# Patient Record
Sex: MALE | Race: WHITE | NOT HISPANIC OR LATINO | Employment: OTHER | URBAN - NONMETROPOLITAN AREA
[De-identification: names, ages, dates, MRNs, and addresses within clinical notes are randomized per-mention and may not be internally consistent; named-entity substitution may affect disease eponyms.]

---

## 2017-07-08 ENCOUNTER — HOSPITAL ENCOUNTER (EMERGENCY)
Facility: HOSPITAL | Age: 62
Discharge: PSYCHIATRIC OR SUBSTANCE USE FACILITY | End: 2017-07-08
Attending: EMERGENCY MEDICINE | Admitting: EMERGENCY MEDICINE

## 2017-07-08 ENCOUNTER — HOSPITAL ENCOUNTER (INPATIENT)
Facility: HOSPITAL | Age: 62
LOS: 4 days | Discharge: HOME | End: 2017-07-12
Attending: PSYCHIATRY & NEUROLOGY | Admitting: PSYCHIATRY & NEUROLOGY

## 2017-07-08 VITALS
DIASTOLIC BLOOD PRESSURE: 112 MMHG | OXYGEN SATURATION: 97 % | HEIGHT: 70 IN | WEIGHT: 190 LBS | TEMPERATURE: 98 F | BODY MASS INDEX: 27.2 KG/M2 | RESPIRATION RATE: 18 BRPM | HEART RATE: 65 BPM | SYSTOLIC BLOOD PRESSURE: 153 MMHG

## 2017-07-08 DIAGNOSIS — F32.89 OTHER DEPRESSION: Primary | ICD-10-CM

## 2017-07-08 DIAGNOSIS — R45.851 SUICIDAL IDEATION: ICD-10-CM

## 2017-07-08 PROBLEM — F32.9 MDD (MAJOR DEPRESSIVE DISORDER): Status: ACTIVE | Noted: 2017-07-08

## 2017-07-08 LAB
6-ACETYL MORPHINE: NEGATIVE
ALBUMIN SERPL-MCNC: 4.7 G/DL (ref 3.4–4.8)
ALBUMIN/GLOB SERPL: 1.3 G/DL (ref 1.5–2.5)
ALP SERPL-CCNC: 109 U/L (ref 40–129)
ALT SERPL W P-5'-P-CCNC: 30 U/L (ref 10–44)
AMPHET+METHAMPHET UR QL: NEGATIVE
ANION GAP SERPL CALCULATED.3IONS-SCNC: 5.9 MMOL/L (ref 3.6–11.2)
AST SERPL-CCNC: 26 U/L (ref 10–34)
BARBITURATES UR QL SCN: NEGATIVE
BASOPHILS # BLD AUTO: 0.1 10*3/MM3 (ref 0–0.3)
BASOPHILS NFR BLD AUTO: 1 % (ref 0–2)
BENZODIAZ UR QL SCN: NEGATIVE
BILIRUB SERPL-MCNC: 0.7 MG/DL (ref 0.2–1.8)
BILIRUB UR QL STRIP: NEGATIVE
BUN BLD-MCNC: 13 MG/DL (ref 7–21)
BUN/CREAT SERPL: 13.3 (ref 7–25)
BUPRENORPHINE SERPL-MCNC: NEGATIVE NG/ML
CALCIUM SPEC-SCNC: 10 MG/DL (ref 7.7–10)
CANNABINOIDS SERPL QL: NEGATIVE
CHLORIDE SERPL-SCNC: 102 MMOL/L (ref 99–112)
CLARITY UR: CLEAR
CO2 SERPL-SCNC: 33.1 MMOL/L (ref 24.3–31.9)
COCAINE UR QL: NEGATIVE
COLOR UR: YELLOW
CREAT BLD-MCNC: 0.98 MG/DL (ref 0.43–1.29)
DEPRECATED RDW RBC AUTO: 44.3 FL (ref 37–54)
EOSINOPHIL # BLD AUTO: 0.18 10*3/MM3 (ref 0–0.7)
EOSINOPHIL NFR BLD AUTO: 1.8 % (ref 0–5)
ERYTHROCYTE [DISTWIDTH] IN BLOOD BY AUTOMATED COUNT: 13.9 % (ref 11.5–14.5)
ETHANOL BLD-MCNC: <10 MG/DL
ETHANOL UR QL: <0.01 %
GFR SERPL CREATININE-BSD FRML MDRD: 78 ML/MIN/1.73
GFR SERPL CREATININE-BSD FRML MDRD: 94 ML/MIN/1.73
GLOBULIN UR ELPH-MCNC: 3.7 GM/DL
GLUCOSE BLD-MCNC: 100 MG/DL (ref 70–110)
GLUCOSE UR STRIP-MCNC: NEGATIVE MG/DL
HCT VFR BLD AUTO: 50.8 % (ref 42–52)
HGB BLD-MCNC: 17.5 G/DL (ref 14–18)
HGB UR QL STRIP.AUTO: NEGATIVE
IMM GRANULOCYTES # BLD: 0.02 10*3/MM3 (ref 0–0.03)
IMM GRANULOCYTES NFR BLD: 0.2 % (ref 0–0.5)
KETONES UR QL STRIP: NEGATIVE
LEUKOCYTE ESTERASE UR QL STRIP.AUTO: NEGATIVE
LYMPHOCYTES # BLD AUTO: 2.52 10*3/MM3 (ref 1–3)
LYMPHOCYTES NFR BLD AUTO: 25.6 % (ref 21–51)
MCH RBC QN AUTO: 30.6 PG (ref 27–33)
MCHC RBC AUTO-ENTMCNC: 34.4 G/DL (ref 33–37)
MCV RBC AUTO: 89 FL (ref 80–94)
METHADONE UR QL SCN: NEGATIVE
MONOCYTES # BLD AUTO: 0.79 10*3/MM3 (ref 0.1–0.9)
MONOCYTES NFR BLD AUTO: 8 % (ref 0–10)
NEUTROPHILS # BLD AUTO: 6.24 10*3/MM3 (ref 1.4–6.5)
NEUTROPHILS NFR BLD AUTO: 63.4 % (ref 30–70)
NITRITE UR QL STRIP: NEGATIVE
OPIATES UR QL: NEGATIVE
OSMOLALITY SERPL CALC.SUM OF ELEC: 281.5 MOSM/KG (ref 273–305)
OXYCODONE UR QL SCN: NEGATIVE
PCP UR QL SCN: NEGATIVE
PH UR STRIP.AUTO: 6.5 [PH] (ref 5–8)
PLATELET # BLD AUTO: 321 10*3/MM3 (ref 130–400)
PMV BLD AUTO: 9.3 FL (ref 6–10)
POTASSIUM BLD-SCNC: 3.6 MMOL/L (ref 3.5–5.3)
PROT SERPL-MCNC: 8.4 G/DL (ref 6–8)
PROT UR QL STRIP: NEGATIVE
RBC # BLD AUTO: 5.71 10*6/MM3 (ref 4.7–6.1)
SODIUM BLD-SCNC: 141 MMOL/L (ref 135–153)
SP GR UR STRIP: 1.02 (ref 1–1.03)
UROBILINOGEN UR QL STRIP: NORMAL
WBC NRBC COR # BLD: 9.85 10*3/MM3 (ref 4.5–12.5)

## 2017-07-08 RX ORDER — ECHINACEA PURPUREA EXTRACT 125 MG
2 TABLET ORAL AS NEEDED
Status: DISCONTINUED | OUTPATIENT
Start: 2017-07-08 | End: 2017-07-12 | Stop reason: HOSPADM

## 2017-07-08 RX ORDER — LOPERAMIDE HYDROCHLORIDE 2 MG/1
2 CAPSULE ORAL 4 TIMES DAILY PRN
Status: DISCONTINUED | OUTPATIENT
Start: 2017-07-08 | End: 2017-07-12 | Stop reason: HOSPADM

## 2017-07-08 RX ORDER — ASPIRIN 81 MG/1
81 TABLET ORAL DAILY
COMMUNITY

## 2017-07-08 RX ORDER — BENZTROPINE MESYLATE 1 MG/1
1 TABLET ORAL DAILY PRN
Status: DISCONTINUED | OUTPATIENT
Start: 2017-07-08 | End: 2017-07-12 | Stop reason: HOSPADM

## 2017-07-08 RX ORDER — ACETAMINOPHEN 325 MG/1
650 TABLET ORAL EVERY 4 HOURS PRN
Status: DISCONTINUED | OUTPATIENT
Start: 2017-07-08 | End: 2017-07-12 | Stop reason: HOSPADM

## 2017-07-08 RX ORDER — TRAZODONE HYDROCHLORIDE 50 MG/1
50 TABLET ORAL NIGHTLY PRN
Status: DISCONTINUED | OUTPATIENT
Start: 2017-07-08 | End: 2017-07-12 | Stop reason: HOSPADM

## 2017-07-08 RX ORDER — BENZONATATE 100 MG/1
100 CAPSULE ORAL 3 TIMES DAILY PRN
Status: DISCONTINUED | OUTPATIENT
Start: 2017-07-08 | End: 2017-07-12 | Stop reason: HOSPADM

## 2017-07-08 RX ORDER — IBUPROFEN 400 MG/1
400 TABLET ORAL ONCE
Status: COMPLETED | OUTPATIENT
Start: 2017-07-08 | End: 2017-07-08

## 2017-07-08 RX ORDER — BENZTROPINE MESYLATE 1 MG/ML
0.5 INJECTION INTRAMUSCULAR; INTRAVENOUS DAILY PRN
Status: DISCONTINUED | OUTPATIENT
Start: 2017-07-08 | End: 2017-07-12 | Stop reason: HOSPADM

## 2017-07-08 RX ORDER — HYDROXYZINE 50 MG/1
50 TABLET, FILM COATED ORAL EVERY 6 HOURS PRN
Status: DISCONTINUED | OUTPATIENT
Start: 2017-07-08 | End: 2017-07-12 | Stop reason: HOSPADM

## 2017-07-08 RX ORDER — ASPIRIN 81 MG/1
81 TABLET ORAL DAILY
Status: DISCONTINUED | OUTPATIENT
Start: 2017-07-09 | End: 2017-07-12 | Stop reason: HOSPADM

## 2017-07-08 RX ORDER — ONDANSETRON 4 MG/1
4 TABLET, FILM COATED ORAL EVERY 6 HOURS PRN
Status: DISCONTINUED | OUTPATIENT
Start: 2017-07-08 | End: 2017-07-12 | Stop reason: HOSPADM

## 2017-07-08 RX ORDER — FAMOTIDINE 20 MG/1
20 TABLET, FILM COATED ORAL 2 TIMES DAILY PRN
Status: DISCONTINUED | OUTPATIENT
Start: 2017-07-08 | End: 2017-07-12 | Stop reason: HOSPADM

## 2017-07-08 RX ORDER — ALUMINA, MAGNESIA, AND SIMETHICONE 2400; 2400; 240 MG/30ML; MG/30ML; MG/30ML
15 SUSPENSION ORAL EVERY 6 HOURS PRN
Status: DISCONTINUED | OUTPATIENT
Start: 2017-07-08 | End: 2017-07-12 | Stop reason: HOSPADM

## 2017-07-08 RX ORDER — LOSARTAN POTASSIUM 25 MG/1
25 TABLET ORAL EVERY 12 HOURS SCHEDULED
Status: DISCONTINUED | OUTPATIENT
Start: 2017-07-08 | End: 2017-07-12 | Stop reason: HOSPADM

## 2017-07-08 RX ORDER — LOSARTAN POTASSIUM 25 MG/1
25 TABLET ORAL 2 TIMES DAILY
COMMUNITY

## 2017-07-08 RX ADMIN — IBUPROFEN 400 MG: 400 TABLET ORAL at 19:24

## 2017-07-08 RX ADMIN — LOSARTAN POTASSIUM 25 MG: 25 TABLET, FILM COATED ORAL at 21:19

## 2017-07-08 RX ADMIN — HYDROXYZINE HYDROCHLORIDE 50 MG: 50 TABLET ORAL at 21:19

## 2017-07-08 RX ADMIN — TRAZODONE HYDROCHLORIDE 50 MG: 50 TABLET ORAL at 21:19

## 2017-07-08 NOTE — ED PROVIDER NOTES
Subjective   Patient is a 62 y.o. male presenting with mental health disorder.   History provided by:  Patient   used: No    Mental Health Problem   Presenting symptoms: aggressive behavior, agitation and suicidal threats    Patient accompanied by:  Caregiver  Degree of incapacity (severity):  Moderate  Onset quality:  Sudden  Duration:  1 day  Timing:  Constant  Progression:  Worsening  Chronicity:  New  Context: not alcohol use, not drug abuse, not noncompliant and not recent medication change    Time since last psychoactive medication taken:  1 day  Relieved by:  Nothing  Worsened by:  Nothing  Associated symptoms: feelings of worthlessness and insomnia    Associated symptoms: no abdominal pain, no anhedonia, no chest pain, no fatigue and no headaches        Review of Systems   Constitutional: Negative for fatigue.   Cardiovascular: Negative for chest pain.   Gastrointestinal: Negative for abdominal pain.   Neurological: Negative for headaches.   Psychiatric/Behavioral: Positive for agitation. The patient has insomnia.    All other systems reviewed and are negative.      Past Medical History:   Diagnosis Date   • Depression    • Hypertension        Allergies   Allergen Reactions   • Lisinopril        Past Surgical History:   Procedure Laterality Date   • SHOULDER SURGERY         Family History   Problem Relation Age of Onset   • No Known Problems Mother    • No Known Problems Father    • No Known Problems Sister    • No Known Problems Brother    • No Known Problems Maternal Aunt    • No Known Problems Paternal Aunt    • No Known Problems Maternal Uncle    • No Known Problems Paternal Uncle    • No Known Problems Maternal Grandfather    • No Known Problems Maternal Grandmother    • No Known Problems Paternal Grandfather    • No Known Problems Paternal Grandmother    • No Known Problems Cousin        Social History     Social History   • Marital status:      Spouse name: N/A   • Number  "of children: N/A   • Years of education: N/A     Social History Main Topics   • Smoking status: Never Smoker   • Smokeless tobacco: None   • Alcohol use No   • Drug use: No   • Sexual activity: Not Asked     Other Topics Concern   • None     Social History Narrative   • None           Objective   Physical Exam   Constitutional: He is oriented to person, place, and time. He appears well-developed and well-nourished. No distress.   HENT:   Head: Normocephalic.   Right Ear: External ear normal.   Left Ear: External ear normal.   Nose: Nose normal.   Mouth/Throat: Oropharynx is clear and moist.   Eyes: Conjunctivae and EOM are normal. Pupils are equal, round, and reactive to light. Right eye exhibits no discharge.   Neck: Normal range of motion. Neck supple. No thyromegaly present.   Cardiovascular: Normal rate, regular rhythm, normal heart sounds and intact distal pulses.    No murmur heard.  Pulmonary/Chest: Effort normal and breath sounds normal.   Abdominal: Soft. Bowel sounds are normal. He exhibits no mass. There is no tenderness. There is no guarding.   Musculoskeletal: Normal range of motion.   Neurological: He is alert and oriented to person, place, and time. No cranial nerve deficit. Coordination normal.   Skin: He is not diaphoretic.   Psychiatric: His mood appears anxious. He is withdrawn. He is not actively hallucinating. Thought content is not paranoid and not delusional. Cognition and memory are not impaired. He exhibits a depressed mood. He expresses suicidal ideation. He expresses no homicidal ideation. He expresses no homicidal plans.   Nursing note and vitals reviewed.      Procedures         ED Course  ED Course   Comment By Time   62-year-old male comes in complaining of \"depression and suicidal ideation\" for the last several days.  Patient denies any alcohol or drug abuse. Patient also complains of chronic diffuse pain. Patient states that he hasn't been taking his paxil or other meds. Not been " able to get his meds. Zeb Elizabeth PA-C 07/08 1837                  [facility]    Final diagnoses:   Other depression   Suicidal ideation            Zeb Elizabeth PA-C  07/08/17 1948

## 2017-07-09 PROCEDURE — 99221 1ST HOSP IP/OBS SF/LOW 40: CPT | Performed by: PSYCHIATRY & NEUROLOGY

## 2017-07-09 RX ORDER — DULOXETIN HYDROCHLORIDE 30 MG/1
30 CAPSULE, DELAYED RELEASE ORAL DAILY
Status: DISCONTINUED | OUTPATIENT
Start: 2017-07-09 | End: 2017-07-09

## 2017-07-09 RX ORDER — DULOXETIN HYDROCHLORIDE 30 MG/1
30 CAPSULE, DELAYED RELEASE ORAL DAILY
Status: DISCONTINUED | OUTPATIENT
Start: 2017-07-10 | End: 2017-07-10

## 2017-07-09 RX ADMIN — LOSARTAN POTASSIUM 25 MG: 25 TABLET, FILM COATED ORAL at 21:30

## 2017-07-09 RX ADMIN — TRAZODONE HYDROCHLORIDE 50 MG: 50 TABLET ORAL at 21:32

## 2017-07-09 RX ADMIN — ASPIRIN 81 MG: 81 TABLET ORAL at 08:32

## 2017-07-09 RX ADMIN — MAGNESIUM HYDROXIDE 10 ML: 2400 SUSPENSION ORAL at 12:54

## 2017-07-09 RX ADMIN — LOSARTAN POTASSIUM 25 MG: 25 TABLET, FILM COATED ORAL at 08:32

## 2017-07-09 NOTE — PLAN OF CARE
Problem: BH Patient Care Overview (Adult)  Goal: Plan of Care Review  Outcome: Ongoing (interventions implemented as appropriate)    07/09/17 0210   Outcome Evaluation   Outcome Summary/Follow up Plan new patient

## 2017-07-09 NOTE — PLAN OF CARE
Problem: BH Patient Care Overview (Adult)  Goal: Plan of Care Review  Outcome: Ongoing (interventions implemented as appropriate)    07/09/17 8711   Outcome Evaluation   Outcome Summary/Follow up Plan Pt cooperative with staff. Pt reports anxietyand depression 8/10. Pt denies SI/HI/JANELL. Pt reports feeling HHW. Continue to monitor.   Coping/Psychosocial Response Interventions   Plan Of Care Reviewed With patient   Coping/Psychosocial   Patient Agreement with Plan of Care agrees   Patient Care Overview   Progress no change

## 2017-07-09 NOTE — H&P
"Winnie Welch RN  Admission Date: 7/8/2017  7:11 AM 07/09/17    Phill Nichols, 62 y.o. Male  Subjective   \"I have a poor attitude\"    Chief Complaint:  Increased Depression, Suicidal Ideation    HPI:  Phill Nichols is a 62 y.o. male who was admitted for complaints of Increased Depression and Suicidal Ideation.  He presented to [facility] ED stating he has felt more depressed and having intrusive suicide thoughts over the past several days.  He was admitted for 18 days at [facility] in [place], [place] approximately 8 to 10 years ago for having a suicide plan and having a loaded rifle cocked and in hands.  He denies a specific plan but admits to thinking shooting himself, cutting his wrists, or driving his vehicle into an oncoming tractor tralor.  Patient's mother passed away in February and her birthday was reported to be in May.  He reports being his mother's primary care giver. He denies any thoughts of homicide, delusions, or paranoia. The patient ideatifes himself as \"having a poor attitude\".  He reports auditory hallucinations describing people hollering and laughing at him.  He is tearful at times and his affect is flat.  He endorses feeling hopeless, helpless, and worthless.  He reports chronic pain related to serving 7 years in the [employer] and states he stresses over financing from his chronic health problems.  The patient reports poor sleep and states his appetite varies.  He denies any history of substance abuse.  The patient has been admitted to the Adult Psychiatric Unit for crisis intervention, safety, and stabilization of symptoms.    On evaluation, he stated his depression has been present for a long time but the past 3 months his depression has escalated. He was supposed to switch from Paxil to Cymbalta. He stated Cymbalta was expensive at [facility] and insurance was not covering it. He is interested in trying Cymbalta if it is affordable. He was given goodRTalentology card and notified to go to [facility] as it is cheaper. He " "reports his sleep is poor, takes 2-3 hours to initiate, wakes up frequently through the night, reports pain wakes him up, reports nightmares frequently, denied nightly. Reports low energy and being \"groggy\" all day. He feels his depression has worsening over the pat 3 months because of the weather and because \"I just get so depressed and get tired of taking my medication\". Stated he is completely out of Paxil, last took it 1 month ago.     Past Psych History:   He has had 1 previous psychiatric hospitalization and denies any current outpatient treatment.  The patient was reportedly admitted to [facility] in [place], [place] approximately 8 to 10 years ago for having a suicidal plan with a loaded rifle cocked and in hands.  He was kept for a total of 18 days.  He hamzah any other suicide attempts. No current medications. He stated he has been on Paxil, Zoloft and was uncertain of others. He stated his PCP had him on Paxil but he ran out and he was going to be on Cymbalta but he was unable to afford it.     Substance Abuse: His UDS is negative, he denies any illicit drug, alcohol, or tobacco use.    Family History:  family history includes No Known Problems in his brother, cousin, father, maternal aunt, maternal grandfather, maternal grandmother, maternal uncle, mother, paternal aunt, paternal grandfather, paternal grandmother, paternal uncle, and sister.    Personal and social history:  Patient was born in [place], [place] and raised with his 9 siblings in [place], [place].  Both of his parents are  having recently lost his mother in February.  The patient is  and reports his children as grown.  He has a high school education and served 7 years in the [employer] and was discharged out honorably.  Patient reports he receives social security and disability.  There are no reports of abuse or arrests and he denies any current legal issues.    Medical/Surgical History:  Negative history for Seizures.    Past Medical History: "   Diagnosis Date   • Depression    • Hypertension      Past Surgical History:   Procedure Laterality Date   • SHOULDER SURGERY         Allergies   Allergen Reactions   • Lisinopril      Social History   Substance Use Topics   • Smoking status: Never Smoker   • Smokeless tobacco: Not on file   • Alcohol use No     Current Medications:   Current Facility-Administered Medications   Medication Dose Route Frequency Provider Last Rate Last Dose   • acetaminophen (TYLENOL) tablet 650 mg  650 mg Oral Q4H PRN Mamta Currie MD       • aluminum-magnesium hydroxide-simethicone (MAALOX MAX) 400-400-40 MG/5ML suspension 15 mL  15 mL Oral Q6H PRN Mamta Currie MD       • aspirin EC tablet 81 mg  81 mg Oral Daily Mamta Currie MD       • benzonatate (TESSALON) capsule 100 mg  100 mg Oral TID PRN Mamta Currie MD       • benztropine (COGENTIN) tablet 1 mg  1 mg Oral Daily PRN Mamta Currie MD        Or   • benztropine (COGENTIN) injection 0.5 mg  0.5 mg Intramuscular Daily PRN Mamta Currie MD       • famotidine (PEPCID) tablet 20 mg  20 mg Oral BID PRN Mamta Currie MD       • hydrOXYzine (ATARAX) tablet 50 mg  50 mg Oral Q6H PRN Mamta Currie MD   50 mg at 07/08/17 2119   • loperamide (IMODIUM) capsule 2 mg  2 mg Oral 4x Daily PRN Mamta Currie MD       • losartan (COZAAR) tablet 25 mg  25 mg Oral Q12H Mamta Currie MD   25 mg at 07/08/17 2119   • magnesium hydroxide (MILK OF MAGNESIA) suspension 2400 mg/10mL 10 mL  10 mL Oral Daily PRN Mamta Currie MD       • ondansetron (ZOFRAN) tablet 4 mg  4 mg Oral Q6H PRN Mamta Currie MD       • sodium chloride (OCEAN) nasal spray 2 spray  2 spray Each Nare PRN Mamta Currie MD       • traZODone (DESYREL) tablet 50 mg  50 mg Oral Nightly PRN Mamta Currie MD   50 mg at 07/08/17 2119       Review of Systems    Review of Systems - General ROS: negative for - chills, fever or malaise  Ophthalmic ROS: negative for - loss of vision  ENT ROS: negative for - hearing change  Allergy and Immunology ROS: negative  for - hives  Hematological and Lymphatic ROS: negative for - bleeding problems  Endocrine ROS: negative for - skin changes  Respiratory ROS: no cough, shortness of breath, or wheezing  Cardiovascular ROS: no chest pain or dyspnea on exertion  Gastrointestinal ROS: no abdominal pain, no black or bloody stools, +constipation  Genito-Urinary ROS: no dysuria, trouble voiding, or hematuria  Musculoskeletal ROS: negative for - gait disturbance +shoulder and back pain   Neurological ROS: no TIA or stroke symptoms +headaches  Dermatological ROS: negative for rash    Objective       General Appearance:    Alert, cooperative,    Head:    Normocephalic, without obvious abnormality, atraumatic   Eyes:            Lids and lashes normal, conjunctivae and sclerae normal, no   icterus, no pallor, corneas clear   Ears:    Ears appear intact with no abnormalities noted   Throat:   No oral lesions, no thrush, oral mucosa moist   Neck:   No adenopathy, supple, trachea midline, no thyromegaly, no   carotid bruit, no JVD   Back:     No kyphosis present, no scoliosis present, no skin lesions,      erythema or scars, no tenderness to percussion or                   palpation,   range of motion normal   Lungs:     Clear to auscultation,respirations regular, even and                  unlabored    Heart:    Regular rhythm and normal rate, normal S1 and S2, no            murmur, no gallop, no rub, no click   Chest Wall:    No abnormalities observed   Abdomen:     Normal bowel sounds, no masses, no organomegaly, soft        non-tender, non-distended, no guarding, no rebound                tenderness   Rectal:     Deferred   Extremities:   Moves all extremities well, no edema, no cyanosis, no             redness   Pulses:   Pulses palpable and equal bilaterally   Skin:   No bleeding, bruising or rash   Lymph nodes:   No palpable adenopathy   Neurologic:   Cranial nerves 2 - 12 grossly intact       Blood pressure 110/67, pulse 51, temperature 97  "°F (36.1 °C), temperature source Tympanic, resp. rate 16, height 70\" (177.8 cm), weight 181 lb 9.6 oz (82.4 kg), SpO2 98 %.    Mental Status Exam:   Hygiene:   good  Cooperation:  Cooperative  Eye Contact:  Fair  Psychomotor Behavior:  Slow  Affect:  Flat, Sad, and Tearful  Hopelessness: 10  Speech:  Slow Pattern  Thought Process:  Linear  Thought Content:  Mood congurent  Suicidal:  Suicidal Ideation  Homicidal:  None  Hallucinations:  Auditory  Delusion:  None  Memory:  Intact  Orientation:  Person, Place and Situation  Reliability:  fair  Insight:  Fair  Judgement:  Fair  Impulse Control:  Poor  Physical/Medical Issues:  Yes HTN    Medical Decision Making:              Labs:         Results for OSMAN ENRIQUEZ (MRN [id number]) as of 7/9/2017 07:12   Ref. Range 7/8/2017 18:27 7/8/2017 18:29   Glucose Latest Ref Range: 70 - 110 mg/dL 100    Sodium Latest Ref Range: 135 - 153 mmol/L 141    Potassium Latest Ref Range: 3.5 - 5.3 mmol/L 3.6    CO2 Latest Ref Range: 24.3 - 31.9 mmol/L 33.1 (H)    Chloride Latest Ref Range: 99 - 112 mmol/L 102    Anion Gap Latest Ref Range: 3.6 - 11.2 mmol/L 5.9    Creatinine Latest Ref Range: 0.43 - 1.29 mg/dL 0.98    BUN Latest Ref Range: 7 - 21 mg/dL 13    BUN/Creatinine Ratio Latest Ref Range: 7.0 - 25.0  13.3    Calcium Latest Ref Range: 7.7 - 10.0 mg/dL 10.0    eGFR Non African Amer Latest Ref Range: >60 mL/min/1.73 78    eGFR  African Amer Latest Ref Range: >60 mL/min/1.73 94    Alkaline Phosphatase Latest Ref Range: 40 - 129 U/L 109    Total Protein Latest Ref Range: 6.0 - 8.0 g/dL 8.4 (H)    ALT (SGPT) Latest Ref Range: 10 - 44 U/L 30    AST (SGOT) Latest Ref Range: 10 - 34 U/L 26    Total Bilirubin Latest Ref Range: 0.2 - 1.8 mg/dL 0.7    Albumin Latest Ref Range: 3.40 - 4.80 g/dL 4.70    Globulin Latest Units: gm/dL 3.7    A/G Ratio Latest Ref Range: 1.5 - 2.5 g/dL 1.3 (L)    WBC Latest Ref Range: 4.50 - 12.50 10*3/mm3 9.85    RBC Latest Ref Range: 4.70 - 6.10 10*6/mm3 5.71  "   Hemoglobin Latest Ref Range: 14.0 - 18.0 g/dL 17.5    Hematocrit Latest Ref Range: 42.0 - 52.0 % 50.8    RDW Latest Ref Range: 11.5 - 14.5 % 13.9    MCV Latest Ref Range: 80.0 - 94.0 fL 89.0    MCH Latest Ref Range: 27.0 - 33.0 pg 30.6    MCHC Latest Ref Range: 33.0 - 37.0 g/dL 34.4    MPV Latest Ref Range: 6.0 - 10.0 fL 9.3    Platelets Latest Ref Range: 130 - 400 10*3/mm3 321    RDW-SD Latest Ref Range: 37.0 - 54.0 fl 44.3    Neutrophil % Latest Ref Range: 30.0 - 70.0 % 63.4    Lymphocyte % Latest Ref Range: 21.0 - 51.0 % 25.6    Monocyte % Latest Ref Range: 0.0 - 10.0 % 8.0    Eosinophil % Latest Ref Range: 0.0 - 5.0 % 1.8    Basophil % Latest Ref Range: 0.0 - 2.0 % 1.0    Immature Grans % Latest Ref Range: 0.0 - 0.5 % 0.2    Neutrophils, Absolute Latest Ref Range: 1.40 - 6.50 10*3/mm3 6.24    Lymphocytes, Absolute Latest Ref Range: 1.00 - 3.00 10*3/mm3 2.52    Monocytes, Absolute Latest Ref Range: 0.10 - 0.90 10*3/mm3 0.79    Eosinophils, Absolute Latest Ref Range: 0.00 - 0.70 10*3/mm3 0.18    Basophils, Absolute Latest Ref Range: 0.00 - 0.30 10*3/mm3 0.10    Immature Grans, Absolute Latest Ref Range: 0.00 - 0.03 10*3/mm3 0.02    Color, UA Latest Ref Range: Yellow, Straw   Yellow   Appearance, UA Latest Ref Range: Clear   Clear   Specific [place], UA Latest Ref Range: 1.005 - 1.030   1.019   pH, UA Latest Ref Range: 5.0 - 8.0   6.5   Glucose, UA Latest Ref Range: Negative   Negative   Ketones, UA Latest Ref Range: Negative   Negative   Blood, UA Latest Ref Range: Negative   Negative   Nitrite, UA Latest Ref Range: Negative   Negative   Leuk Esterase, UA Latest Ref Range: Negative   Negative   Protein, UA Latest Ref Range: Negative   Negative   Bilirubin, UA Latest Ref Range: Negative   Negative   Urobilinogen, UA Latest Ref Range: 0.2 - 1.0 E.U./dL   0.2 E.U./dL   Ethanol % Latest Units: % <0.010    Ethanol Latest Ref Range: <=10 mg/dL <10    Amphetamine Screen, Urine Latest Ref Range: Negative   Negative    Barbiturates Screen, Urine Latest Ref Range: Negative   Negative   Benzodiazepine Screen, Urine Latest Ref Range: Negative   Negative   Buprenorphine, Screen, Urine Latest Ref Range: Negative   Negative   Cocaine Screen, Urine Latest Ref Range: Negative   Negative   Methadone Screen , Urine Latest Ref Range: Negative   Negative   Opiate Screen, Urine Latest Ref Range: Negative   Negative   Oxycodone Screen, Urine Latest Ref Range: Negative   Negative   Phencyclidine (PCP), Urine Latest Ref Range: Negative   Negative   THC Screen, Urine Latest Ref Range: Negative   Negative   6-ACETYL MORPHINE Latest Ref Range: Negative   Negative   Osmolality Calc Latest Ref Range: 273.0 - 305.0 mOsm/kg 281.5             Medications:                aspirin 81 mg Oral Daily   losartan 25 mg Oral Q12H                  •  acetaminophen  •  aluminum-magnesium hydroxide-simethicone  •  benzonatate  •  benztropine **OR** benztropine  •  famotidine  •  hydrOXYzine  •  loperamide  •  magnesium hydroxide  •  ondansetron  •  sodium chloride  •  traZODone   All medications reviewed.    Special Precautions: Continue current level of Special Precautions.            Assessment/Plan  Monitor and treat in a safe and secure environment.       Patient Active Problem List   Diagnosis Code   • MDD (major depressive disorder) F32.9       The patient has been admitted to the [facility] for safety and symptom stabilization. The patient has been given routine orders and placed on special precautions. The patient will be assigned a Master Level Therapist.  Dr. NESHA Currie.will assess the patient daily and work with the treatment team to develop a plan of care.     -Ordered Cymbalta 30mg QAM for tomorrow morning, will need aid in paying for medication upon discharge. Given GoodRX coupon.   -Restarted other home medications     We discussed risks, benefits, and side effects of the above medication and the patient was agreeable with the plan.              Attestation:  I, Winnie Welch RN acted as scribe for Dr. NESHA Currie.                Physician Attestation: I attest that the above note accurately reflects work and decisions made by me.

## 2017-07-10 PROCEDURE — 99232 SBSQ HOSP IP/OBS MODERATE 35: CPT | Performed by: PSYCHIATRY & NEUROLOGY

## 2017-07-10 RX ORDER — SERTRALINE HYDROCHLORIDE 25 MG/1
25 TABLET, FILM COATED ORAL DAILY
Status: DISCONTINUED | OUTPATIENT
Start: 2017-07-10 | End: 2017-07-12 | Stop reason: HOSPADM

## 2017-07-10 RX ORDER — LACTULOSE 10 G/15ML
20 SOLUTION ORAL DAILY
Status: COMPLETED | OUTPATIENT
Start: 2017-07-10 | End: 2017-07-11

## 2017-07-10 RX ADMIN — SERTRALINE 25 MG: 25 TABLET, FILM COATED ORAL at 16:45

## 2017-07-10 RX ADMIN — ASPIRIN 81 MG: 81 TABLET ORAL at 08:15

## 2017-07-10 RX ADMIN — DULOXETINE HYDROCHLORIDE 30 MG: 30 CAPSULE, DELAYED RELEASE ORAL at 08:15

## 2017-07-10 RX ADMIN — LOSARTAN POTASSIUM 25 MG: 25 TABLET, FILM COATED ORAL at 21:08

## 2017-07-10 RX ADMIN — LACTULOSE 20 G: 20 SOLUTION ORAL at 16:45

## 2017-07-10 RX ADMIN — LOSARTAN POTASSIUM 25 MG: 25 TABLET, FILM COATED ORAL at 08:15

## 2017-07-10 RX ADMIN — TRAZODONE HYDROCHLORIDE 50 MG: 50 TABLET ORAL at 21:08

## 2017-07-10 RX ADMIN — HYDROXYZINE HYDROCHLORIDE 50 MG: 50 TABLET ORAL at 21:08

## 2017-07-10 RX ADMIN — MAGNESIUM HYDROXIDE 10 ML: 2400 SUSPENSION ORAL at 14:55

## 2017-07-10 NOTE — PLAN OF CARE
Problem: BH Patient Care Overview (Adult)  Goal: Plan of Care Review  Outcome: Ongoing (interventions implemented as appropriate)    07/10/17 1913   Outcome Evaluation   Outcome Summary/Follow up Plan CLIENT STAYS TO SELF DURING MAJORITY OF SHIFT, MINIMAL INTERACTION. NO OUTBURSTS OR ISSUES DURING SHIFT.   Coping/Psychosocial Response Interventions   Plan Of Care Reviewed With patient   Coping/Psychosocial   Patient Agreement with Plan of Care agrees   Patient Care Overview   Progress no change         Problem:  Overarching Goals  Goal: Adheres to Safety Considerations for Self and Others  Outcome: Ongoing (interventions implemented as appropriate)  Goal: Optimized Coping Skills in Response to Life Stressors  Outcome: Ongoing (interventions implemented as appropriate)  Goal: Develops/Participates in Therapeutic Nelson to Support Successful Transition  Outcome: Ongoing (interventions implemented as appropriate)

## 2017-07-10 NOTE — PLAN OF CARE
Problem: BH Patient Care Overview (Adult)  Goal: Plan of Care Review  Outcome: Ongoing (interventions implemented as appropriate)    07/10/17 0204   Outcome Evaluation   Outcome Summary/Follow up Plan Rates anxiety an 8, depression a 6, deneis si/hi/torin.   Coping/Psychosocial Response Interventions   Plan Of Care Reviewed With patient   Coping/Psychosocial   Patient Agreement with Plan of Care agrees   Patient Care Overview   Progress no change

## 2017-07-10 NOTE — PLAN OF CARE
Problem: BH Patient Care Overview (Adult)  Goal: Plan of Care Review  Outcome: Ongoing (interventions implemented as appropriate)    07/10/17 0204 07/10/17 1405   Coping/Psychosocial Response Interventions   Plan Of Care Reviewed With patient --    Coping/Psychosocial   Patient Agreement with Plan of Care agrees --    Patient Care Overview   Progress --  no change       Goal: Individualization and Mutuality  Outcome: Ongoing (interventions implemented as appropriate)    07/10/17 1328   Behavioral Health Screens   Patient Personal Strengths expressive of needs;expressive of emotions;coping skills;resourceful;stable living environment;spiritual/Orthodoxy support   Patient Vulnerabilities history of PTSD, depression       Goal: Discharge Needs Assessment  Outcome: Ongoing (interventions implemented as appropriate)    07/08/17 2038 07/10/17 1405   Discharge Needs Assessment   Concerns To Be Addressed --  coping/stress concerns;suicidal concerns;mental health concerns   Readmission Within The Last 30 Days --  no previous admission in last 30 days   Community Agency Name(S) --  Patient is only agreeable to see his family physician   Current Discharge Risk --  psychiatric illness   Discharge Planning Comments --  Patient has Medicare AB, receives disability and has his own vehicle which is here at the hospital.   Discharge Needs Assessment   Outpatient/Agency/Support Group Needs --  outpatient medication management   Anticipated Discharge Disposition --  home with family   Living Environment   Transportation Available car --    DATA: Met with patient initially to complete initial assessment, social history, integrated summary, review care planning and disposition discussion.  Patient is a 62 year old  male currently residing in [place] with his sister who he reports is concerned however, goes not want this therapist to contact her.  Patient is disabled, high school educated and spent 8 years in the [employer].   Patient also drove coal trucks up until  2010.  Patient reports that he has a history of PTSD from when he was in the  and reports he has struggled with depression since 1985.  Patient reports that he recently had worsening of his depression and could not identify a stressor.  Patient states his sister thought that the Paxil he had been on before was making him irritable.  Patient reported that he was having some severe pain in his head, neck and back which did result in his decision to drive himself to the emergency room and his blood pressure was elevated because he had discontinued his medication.  Patient reports he is very involved in his Yazidi and reports that his  came to visit him yesterday.  Patient reports that he is feeling much better today but did present to the emergency room suicidal with a plan.  Patient is only agreeable to follow up with his family doctor.   ASSESSMENT:  Patient presents with suicidal ideation and a plan to drive his car into a tractor trailer.    Patient reports acute increase in depression and anxiety.  Patient is a danger to self and requires further hospitalization for stabilization of symptoms.  PLAN:  Patient will continue stabilization.  Patient will engage in individual and group therapy to address coping and review crisis safety planning as well as appropriate disposition.  Patient is verbalizing a plan currently to drive his truck home upon his stabilization.  Patient is only consenting to follow up with his family physician.

## 2017-07-10 NOTE — PROGRESS NOTES
"Behavioral Health Treatment Plan and Problem List: I have reviewed and approved the Behavioral Health Treatment Plan and Problem list.     LOS: 2 days     Allergies  Allergies   Allergen Reactions   • Lisinopril        Assessment completed within view of staff    Chief Complaint:  depression    Subjective     Interval History: The patient states that he was feeling depressed and hopeless because he ran out of medication and couldn't afford Cymbalta. He has a pharmacy card but he is also on Medicare, and it is stated on the card that it will not work in conjunction with Medicare or Medicaid. The patient agreed to stop Cymbalta and start Zoloft. He also agreed to take Lactulose for his constipation.      Review of Systems:   General ROS: negative for - fever or malaise  Endocrine ROS: negative for - palpitations  Respiratory ROS: no cough, shortness of breath, or wheezing  Cardiovascular ROS: no chest pain or dyspnea on exertion  Gastrointestinal ROS: constipation for last two weeks.  Musculoskeletal ROS: Pain in right shoulder and back    Objective     Vital Signs  /68 (BP Location: Left arm, Patient Position: Lying)  Pulse 52  Temp 97.6 °F (36.4 °C) (Tympanic)   Resp 18  Ht 70\" (177.8 cm)  Wt 181 lb 9.6 oz (82.4 kg)  SpO2 96%  BMI 26.06 kg/m2    Mental Status Exam:   Mood/Affect: sad/depressed  Thought Processes:  linear, logical, and goal directed  Thought Content:  normal  Hallucination(s): no  Hopelessness: no  Suicidal Thoughts:  none  Suicidal Plan/Intent: none  Homicidal Thoughts:  absent     Results Review:    Lab Results (last 24 hours)     ** No results found for the last 24 hours. **        Imaging Results (last 24 hours)     ** No results found for the last 24 hours. **          Medication Review:   Scheduled Medications:    aspirin 81 mg Oral Daily   DULoxetine 30 mg Oral Daily   losartan 25 mg Oral Q12H        PRN Medications:  •  acetaminophen  •  aluminum-magnesium " hydroxide-simethicone  •  benzonatate  •  benztropine **OR** benztropine  •  famotidine  •  hydrOXYzine  •  loperamide  •  magnesium hydroxide  •  ondansetron  •  sodium chloride  •  traZODone   All medications reviewed.      Assessment/Plan   Patient Active Problem List   Diagnosis Code   • MDD (major depressive disorder) F32.9     Plan:  - Stop Cymbalta  - Start Zoloft 25 mg daily  - Lactulose for constipation  - Motrin for shoulder and back pain        Liliya Mcintyre MD  07/10/17  3:13 PM

## 2017-07-11 PROCEDURE — 99232 SBSQ HOSP IP/OBS MODERATE 35: CPT | Performed by: PSYCHIATRY & NEUROLOGY

## 2017-07-11 RX ADMIN — ASPIRIN 81 MG: 81 TABLET ORAL at 08:27

## 2017-07-11 RX ADMIN — LACTULOSE 20 G: 20 SOLUTION ORAL at 08:27

## 2017-07-11 RX ADMIN — SERTRALINE 25 MG: 25 TABLET, FILM COATED ORAL at 08:27

## 2017-07-11 RX ADMIN — HYDROXYZINE HYDROCHLORIDE 50 MG: 50 TABLET ORAL at 21:14

## 2017-07-11 RX ADMIN — LOSARTAN POTASSIUM 25 MG: 25 TABLET, FILM COATED ORAL at 21:14

## 2017-07-11 RX ADMIN — LOSARTAN POTASSIUM 25 MG: 25 TABLET, FILM COATED ORAL at 08:27

## 2017-07-11 NOTE — PLAN OF CARE
"Problem:  Patient Care Overview (Adult)  Goal: Discharge Needs Assessment  Outcome: Ongoing (interventions implemented as appropriate)    07/08/17 2038 07/10/17 1405 07/11/17 1232   Discharge Needs Assessment   Concerns To Be Addressed --  --  coping/stress concerns;mental health concerns   Readmission Within The Last 30 Days --  no previous admission in last 30 days --    Community Agency Name(S) --  Patient is only agreeable to see his family physician --    Current Discharge Risk --  psychiatric illness --    Discharge Planning Comments --  Patient has Medicare AB, receives disability and has his own vehicle which is here at the hospital. --    Discharge Needs Assessment   Outpatient/Agency/Support Group Needs --  outpatient medication management --    Anticipated Discharge Disposition --  home with family --    Discharge Disposition --  --  home with family   Living Environment   Transportation Available car --  --       DATA: Met with patient and he reported that he is feeling better today.  Patient reports that he thinks the medication is helping him.  He reported that he would like to get home and watch some westerns on television.  He also reported that he enjoys watching races on a dirt tracks.  He reported that he did get to go to a track in [place] last month and he is hopeful to go again soon.  He reported that the \"good Lord will help me\" and stated that he just has to let him.  Patient reports some pain in his back but it is tolerable most of the time.  Patient continues to maintain that he will engage in aftercare with his family physician and is not interested in other options at this time.     ASSESSMENT:  Patient is denying suicidal ideation today and he is denying homicidal ideation today.  Patient reports feeling less depressed and less anxious today.     PLAN:  Patient will continue stabilization.  Patient plans to see his family physician upon his stabilization.        "

## 2017-07-11 NOTE — PLAN OF CARE
Problem: BH Patient Care Overview (Adult)  Goal: Plan of Care Review  Outcome: Ongoing (interventions implemented as appropriate)    07/10/17 1741 07/11/17 0134   Outcome Evaluation   Outcome Summary/Follow up Plan CLIENT STAYS TO SELF DURING MAJORITY OF SHIFT, MINIMAL INTERACTION. NO OUTBURSTS OR ISSUES DURING SHIFT. --    Coping/Psychosocial Response Interventions   Plan Of Care Reviewed With --  patient   Coping/Psychosocial   Patient Agreement with Plan of Care --  agrees   Patient Care Overview   Progress --  no change

## 2017-07-11 NOTE — PLAN OF CARE
Problem: BH Patient Care Overview (Adult)  Goal: Plan of Care Review  Outcome: Ongoing (interventions implemented as appropriate)  Patient isolates most of this shift in his room but comes out for meals. Patient reports good sleep and appetite, denies anxiety and depression at this time. Patient mood is quiet and reports medications are helping. Patient has been calm and cooperative with staff, denies any thoughts to harm self. Will continue to monitor.    07/11/17 4152   Coping/Psychosocial Response Interventions   Plan Of Care Reviewed With patient   Coping/Psychosocial   Patient Agreement with Plan of Care agrees   Patient Care Overview   Progress improving       Goal: Interdisciplinary Rounds/Family Conference  Outcome: Ongoing (interventions implemented as appropriate)  Goal: Individualization and Mutuality  Outcome: Ongoing (interventions implemented as appropriate)  Goal: Discharge Needs Assessment  Outcome: Ongoing (interventions implemented as appropriate)    Problem:  Overarching Goals  Goal: Adheres to Safety Considerations for Self and Others  Outcome: Ongoing (interventions implemented as appropriate)  Goal: Optimized Coping Skills in Response to Life Stressors  Outcome: Ongoing (interventions implemented as appropriate)  Goal: Develops/Participates in Therapeutic [place] to Support Successful Transition  Outcome: Ongoing (interventions implemented as appropriate)

## 2017-07-11 NOTE — PROGRESS NOTES
"Behavioral Health Treatment Plan and Problem List: I have reviewed and approved the Behavioral Health Treatment Plan and Problem list.     LOS: 3 days     Allergies  Allergies   Allergen Reactions   • Lisinopril        Assessment completed within view of staff    Chief Complaint:  depression    Subjective     Interval History: The patient states that he is feeling better and the medications are helping. He states that he is feeling more hopeful and he is looking forward to a family reunion next weekend.    Review of Systems:   General ROS: negative for - fever or malaise  Endocrine ROS: negative for - palpitations  Respiratory ROS: no cough, shortness of breath, or wheezing  Cardiovascular ROS: no chest pain or dyspnea on exertion  Gastrointestinal ROS: constipation resolved.  Musculoskeletal ROS: Pain in right shoulder and back    Objective     Vital Signs  /64 (BP Location: Right arm, Patient Position: Sitting)  Pulse 55  Temp 98.4 °F (36.9 °C) (Tympanic)   Resp 18  Ht 70\" (177.8 cm)  Wt 181 lb 9.6 oz (82.4 kg)  SpO2 93%  BMI 26.06 kg/m2    Mental Status Exam:   Mood/Affect: sad/depressed  Thought Processes:  linear, logical, and goal directed  Thought Content:  normal  Hallucination(s): no  Hopelessness: no  Suicidal Thoughts:  none  Suicidal Plan/Intent: none  Homicidal Thoughts:  absent     Results Review:    Lab Results (last 24 hours)     ** No results found for the last 24 hours. **        Imaging Results (last 24 hours)     ** No results found for the last 24 hours. **          Medication Review:   Scheduled Medications:    aspirin 81 mg Oral Daily   losartan 25 mg Oral Q12H   sertraline 25 mg Oral Daily        PRN Medications:  •  acetaminophen  •  aluminum-magnesium hydroxide-simethicone  •  benzonatate  •  benztropine **OR** benztropine  •  famotidine  •  hydrOXYzine  •  loperamide  •  magnesium hydroxide  •  ondansetron  •  sodium chloride  •  traZODone   All medications " reviewed.      Assessment/Plan   Patient Active Problem List   Diagnosis Code   • MDD (major depressive disorder) F32.9     Plan:  - Plan dc tomorrow.        Liliya Mcintyre MD  07/11/17  4:53 PM

## 2017-07-12 VITALS
DIASTOLIC BLOOD PRESSURE: 67 MMHG | HEART RATE: 51 BPM | OXYGEN SATURATION: 99 % | BODY MASS INDEX: 26 KG/M2 | TEMPERATURE: 99.2 F | RESPIRATION RATE: 18 BRPM | SYSTOLIC BLOOD PRESSURE: 105 MMHG | WEIGHT: 181.6 LBS | HEIGHT: 70 IN

## 2017-07-12 PROCEDURE — 99238 HOSP IP/OBS DSCHRG MGMT 30/<: CPT | Performed by: PSYCHIATRY & NEUROLOGY

## 2017-07-12 RX ORDER — SERTRALINE HYDROCHLORIDE 25 MG/1
25 TABLET, FILM COATED ORAL DAILY
Qty: 30 TABLET | Refills: 0 | Status: SHIPPED | OUTPATIENT
Start: 2017-07-12

## 2017-07-12 RX ADMIN — ACETAMINOPHEN 650 MG: 325 TABLET ORAL at 09:07

## 2017-07-12 RX ADMIN — ASPIRIN 81 MG: 81 TABLET ORAL at 09:07

## 2017-07-12 RX ADMIN — LOSARTAN POTASSIUM 25 MG: 25 TABLET, FILM COATED ORAL at 09:07

## 2017-07-12 RX ADMIN — SERTRALINE 25 MG: 25 TABLET, FILM COATED ORAL at 09:07

## 2017-07-12 NOTE — DISCHARGE SUMMARY
Date of Discharge:  7/12/2017    Discharge Diagnosis:Active Problems:    MDD (major depressive disorder)        Presenting Problem/History of Present Illness  MDD (major depressive disorder) [F32.9]     Hospital Course  Patient is a 62 y.o. male presented with severe depression and suicidal ideations. The patient was admitted to the [facility] AE2 unit for safety, further evaluation and treatment.  He was feeling overwhelmed because he couldn't afford his Cymbalta and was off the antidepressant medication. At first he was prescribed Cymbalta and the hope was that with a [employer] coupon he would be able to afford it, but the print on the card indicated that it would not work for someone with Medicare or Medicaid coverage which made the patient ineligible. Hence Cymbalta was stopped and he was started on Zoloft 25 mg daily to help treat his depressive symptoms. He tolerated the medication well and didn't report any side effects.  The patient was also able to take part in individual and group counseling sessions and work on appropriate coping skills.  The patient made steady improvement in his mood and expressed feeling more positive and hopeful about future. Sleep and appetite were improved.  The day of discharge the patient was calm, cooperative and pleasant. Mood was reported to be good, and denied SI/HI/AVH. Also reported no medication side effects.  .      Procedures Performed         Consults:   Consults     No orders found from 6/9/2017 to 7/9/2017.          Pertinent Test Results: CBC, CMP, UA, UDS were unremarkable.      Condition on Discharge:  stable    Vital Signs  Temp:  [97.6 °F (36.4 °C)-99.2 °F (37.3 °C)] 99.2 °F (37.3 °C)  Heart Rate:  [51-60] 51  Resp:  [18] 18  BP: (105-147)/(67-86) 105/67      Discharge Disposition  Home or Self Care    Discharge Medications   Phill Nichols   Home Medication Instructions GRETCHEN:[id number]    Printed on:07/12/17 1410   Medication Information                       aspirin 81 MG EC tablet  Take 81 mg by mouth Daily.             losartan (COZAAR) 25 MG tablet  Take 25 mg by mouth 2 (Two) Times a Day.             sertraline (ZOLOFT) 25 MG tablet  Take 1 tablet by mouth Daily.                 Discharge Diet: Regular    Activity at Discharge: As tolerated    Follow-up Appointments  [facility].       Liliya Mcintyre MD  07/12/17  2:10 PM

## 2017-07-12 NOTE — PLAN OF CARE
Problem: BH Patient Care Overview (Adult)  Goal: Plan of Care Review  Outcome: Ongoing (interventions implemented as appropriate)    07/11/17 2111   Outcome Evaluation   Outcome Summary/Follow up Plan Pt reports 0 anxiety and depression with no SI at this time. No acute concerns voiced at this time.    Coping/Psychosocial Response Interventions   Plan Of Care Reviewed With patient   Coping/Psychosocial   Patient Agreement with Plan of Care agrees   Patient Care Overview   Progress no change         Problem:  Overarching Goals  Goal: Adheres to Safety Considerations for Self and Others  Outcome: Ongoing (interventions implemented as appropriate)  Goal: Optimized Coping Skills in Response to Life Stressors  Outcome: Ongoing (interventions implemented as appropriate)  Goal: Develops/Participates in Therapeutic [place] to Support Successful Transition  Outcome: Ongoing (interventions implemented as appropriate)

## 2023-04-18 ENCOUNTER — HOSPITAL ENCOUNTER (EMERGENCY)
Facility: HOSPITAL | Age: 68
Discharge: HOME | End: 2023-04-18
Attending: STUDENT IN AN ORGANIZED HEALTH CARE EDUCATION/TRAINING PROGRAM | Admitting: STUDENT IN AN ORGANIZED HEALTH CARE EDUCATION/TRAINING PROGRAM
Payer: OTHER MISCELLANEOUS

## 2023-04-18 ENCOUNTER — APPOINTMENT (OUTPATIENT)
Dept: GENERAL RADIOLOGY | Facility: HOSPITAL | Age: 68
End: 2023-04-18
Payer: OTHER MISCELLANEOUS

## 2023-04-18 VITALS
HEIGHT: 70 IN | RESPIRATION RATE: 18 BRPM | OXYGEN SATURATION: 94 % | DIASTOLIC BLOOD PRESSURE: 81 MMHG | HEART RATE: 60 BPM | BODY MASS INDEX: 27.06 KG/M2 | TEMPERATURE: 97.7 F | SYSTOLIC BLOOD PRESSURE: 140 MMHG | WEIGHT: 189 LBS

## 2023-04-18 DIAGNOSIS — S65.512A LACERATION OF BLOOD VESSEL OF RIGHT MIDDLE FINGER, INITIAL ENCOUNTER: Primary | ICD-10-CM

## 2023-04-18 PROCEDURE — 99282 EMERGENCY DEPT VISIT SF MDM: CPT

## 2023-04-18 PROCEDURE — 25010000002 TETANUS-DIPHTH-ACELL PERTUSSIS 5-2.5-18.5 LF-MCG/0.5 SUSPENSION PREFILLED SYRINGE: Performed by: PHYSICIAN ASSISTANT

## 2023-04-18 PROCEDURE — 90715 TDAP VACCINE 7 YRS/> IM: CPT | Performed by: PHYSICIAN ASSISTANT

## 2023-04-18 PROCEDURE — 73130 X-RAY EXAM OF HAND: CPT

## 2023-04-18 PROCEDURE — 73130 X-RAY EXAM OF HAND: CPT | Performed by: RADIOLOGY

## 2023-04-18 PROCEDURE — 90471 IMMUNIZATION ADMIN: CPT | Performed by: PHYSICIAN ASSISTANT

## 2023-04-18 RX ADMIN — TETANUS TOXOID, REDUCED DIPHTHERIA TOXOID AND ACELLULAR PERTUSSIS VACCINE, ADSORBED 0.5 ML: 5; 2.5; 8; 8; 2.5 SUSPENSION INTRAMUSCULAR at 17:28

## 2023-04-18 NOTE — ED NOTES
MEDICAL SCREENING:    Reason for Visit: puncture wound, right middle finger. Not UTD on tetanus.    Patient initially seen in triage.  The patient was advised further evaluation and diagnostic testing will be needed, some of the treatment and testing will be initiated in the lobby in order to begin the process.  The patient will be returned to the waiting area for the time being and possibly be re-assessed by a subsequent ED provider.  The patient will be brought back to the treatment area in as timely manner as possible.         Jairo Matias, PA-C  04/18/23 165

## 2023-04-18 NOTE — ED PROVIDER NOTES
Subjective   History of Present Illness  Patient is a 67-year-old male with no significant past medical history presenting to the ER complaints of laceration.  Patient was at work when he accidentally hit a nail on his right middle finger.  Patient reports bleeding afterwards but no pain.  Patient denies any additional symptoms at this time.  Patient denies any alleviating or worsening factors.  Patient's tetanus vaccine is not up-to-date    History provided by:  Patient   used: No        Review of Systems   Constitutional: Negative.  Negative for fever.   HENT: Negative.    Respiratory: Negative.    Cardiovascular: Negative.  Negative for chest pain.   Gastrointestinal: Negative.  Negative for abdominal pain.   Endocrine: Negative.    Genitourinary: Negative.  Negative for dysuria.   Skin: Positive for wound.   Neurological: Negative.    Psychiatric/Behavioral: Negative.    All other systems reviewed and are negative.      Past Medical History:   Diagnosis Date   • Depression    • Hypertension        Allergies   Allergen Reactions   • Lisinopril        Past Surgical History:   Procedure Laterality Date   • SHOULDER SURGERY         Family History   Problem Relation Age of Onset   • No Known Problems Mother    • No Known Problems Father    • No Known Problems Sister    • No Known Problems Brother    • No Known Problems Maternal Aunt    • No Known Problems Paternal Aunt    • No Known Problems Maternal Uncle    • No Known Problems Paternal Uncle    • No Known Problems Maternal Grandfather    • No Known Problems Maternal Grandmother    • No Known Problems Paternal Grandfather    • No Known Problems Paternal Grandmother    • No Known Problems Cousin        Social History     Socioeconomic History   • Marital status:    Tobacco Use   • Smoking status: Never   Substance and Sexual Activity   • Alcohol use: No   • Drug use: No           Objective   Physical Exam  Vitals and nursing note reviewed.    Constitutional:       General: He is not in acute distress.     Appearance: He is well-developed. He is not diaphoretic.   HENT:      Head: Normocephalic and atraumatic.      Right Ear: External ear normal.      Left Ear: External ear normal.      Nose: Nose normal.   Eyes:      Conjunctiva/sclera: Conjunctivae normal.      Pupils: Pupils are equal, round, and reactive to light.   Neck:      Vascular: No JVD.      Trachea: No tracheal deviation.   Cardiovascular:      Rate and Rhythm: Normal rate and regular rhythm.      Heart sounds: Normal heart sounds. No murmur heard.  Pulmonary:      Effort: Pulmonary effort is normal. No respiratory distress.      Breath sounds: Normal breath sounds. No wheezing.   Abdominal:      General: Bowel sounds are normal.      Palpations: Abdomen is soft.      Tenderness: There is no abdominal tenderness.   Musculoskeletal:         General: No deformity. Normal range of motion.      Cervical back: Normal range of motion and neck supple.   Skin:     General: Skin is warm and dry.      Coloration: Skin is not pale.      Findings: Laceration present. No erythema or rash.          Neurological:      Mental Status: He is alert and oriented to person, place, and time.      Cranial Nerves: No cranial nerve deficit.   Psychiatric:         Behavior: Behavior normal.         Thought Content: Thought content normal.         Laceration Repair    Date/Time: 4/18/2023 5:53 PM  Performed by: Ashely Mendez APRN  Authorized by: Jose Alejandro Quick DO     Consent:     Consent obtained:  Verbal    Consent given by:  Patient    Risks, benefits, and alternatives were discussed: yes      Risks discussed:  Infection, need for additional repair, nerve damage, poor cosmetic result, pain, poor wound healing, vascular damage, tendon damage and retained foreign body    Alternatives discussed:  No treatment, delayed treatment, referral and observation  Universal protocol:     Procedure explained and  questions answered to patient or proxy's satisfaction: yes      Relevant documents present and verified: yes      Test results available: yes      Imaging studies available: yes      Required blood products, implants, devices, and special equipment available: yes      Site/side marked: yes      Immediately prior to procedure, a time out was called: yes      Patient identity confirmed:  Verbally with patient and arm band  Anesthesia:     Anesthesia method:  None  Laceration details:     Location:  Finger    Finger location:  R long finger    Length (cm):  0.7  Exploration:     Limited defect created (wound extended): no      Hemostasis achieved with:  Direct pressure    Imaging obtained: x-ray      Imaging outcome: foreign body not noted      Wound exploration: wound explored through full range of motion      Wound extent: no areolar tissue violation noted, no fascia violation noted, no foreign bodies/material noted, no muscle damage noted, no nerve damage noted, no tendon damage noted, no underlying fracture noted and no vascular damage noted      Contaminated: no    Treatment:     Area cleansed with:  Povidone-iodine    Amount of cleaning:  Standard    Irrigation solution:  Sterile saline    Debridement:  None    Undermining:  None    Scar revision: no    Skin repair:     Repair method:  Tissue adhesive  Approximation:     Approximation:  Close  Repair type:     Repair type:  Simple  Post-procedure details:     Dressing:  Open (no dressing)    Procedure completion:  Tolerated well, no immediate complications               ED Course  ED Course as of 04/18/23 1755   Tue Apr 18, 2023   1738 XR Hand 3+ View Right [SM]      ED Course User Index  [SM] Ashely Mendez, GRUPO                                           Medical Decision Making  Patient is a 67-year-old male with no significant past medical history presenting to the ER complaints of laceration.  Patient was at work when he accidentally hit a nail on his  right middle finger.  Patient reports bleeding afterwards but no pain.  Patient denies any additional symptoms at this time.  Patient denies any alleviating or worsening factors.  Patient's tetanus vaccine is not up-to-date    Advised patient to return to the ER with new or worsening symptoms.  Advised patient to follow-up with PCP in 1 week for wound recheck.  Advised patient to keep wound clean and dry.  Tetanus vaccine given during stay here at the ER.  No acute distress noted at discharge.  Vital signs are stable.    Laceration of blood vessel of right middle finger, initial encounter: complicated acute illness or injury  Amount and/or Complexity of Data Reviewed  Radiology: ordered. Decision-making details documented in ED Course.      Risk  Prescription drug management.          Final diagnoses:   Laceration of blood vessel of right middle finger, initial encounter       ED Disposition  ED Disposition     ED Disposition   Discharge    Condition   Stable    Comment   --             Julia Becerra, APRN  [address] [place] [address]  [phone]    Schedule an appointment as soon as possible for a visit in 1 week  For wound re-check         Medication List      No changes were made to your prescriptions during this visit.          Ashely Mendez, APRN  04/18/23 1058

## 2024-02-28 ENCOUNTER — HOSPITAL ENCOUNTER (EMERGENCY)
Facility: HOSPITAL | Age: 69
Discharge: HOME | End: 2024-02-28
Attending: STUDENT IN AN ORGANIZED HEALTH CARE EDUCATION/TRAINING PROGRAM | Admitting: STUDENT IN AN ORGANIZED HEALTH CARE EDUCATION/TRAINING PROGRAM
Payer: MEDICARE

## 2024-02-28 ENCOUNTER — APPOINTMENT (OUTPATIENT)
Dept: GENERAL RADIOLOGY | Facility: HOSPITAL | Age: 69
End: 2024-02-28
Payer: MEDICARE

## 2024-02-28 VITALS
HEART RATE: 62 BPM | BODY MASS INDEX: 24.25 KG/M2 | OXYGEN SATURATION: 97 % | DIASTOLIC BLOOD PRESSURE: 85 MMHG | TEMPERATURE: 97.6 F | SYSTOLIC BLOOD PRESSURE: 156 MMHG | WEIGHT: 169.4 LBS | RESPIRATION RATE: 14 BRPM | HEIGHT: 70 IN

## 2024-02-28 DIAGNOSIS — R53.83 FATIGUE, UNSPECIFIED TYPE: Primary | ICD-10-CM

## 2024-02-28 LAB
ALBUMIN SERPL-MCNC: 4.1 G/DL (ref 3.5–5.2)
ALBUMIN/GLOB SERPL: 1.2 G/DL
ALP SERPL-CCNC: 117 U/L (ref 39–117)
ALT SERPL W P-5'-P-CCNC: 15 U/L (ref 1–41)
ANION GAP SERPL CALCULATED.3IONS-SCNC: 11.4 MMOL/L (ref 5–15)
AST SERPL-CCNC: 20 U/L (ref 1–40)
BACTERIA UR QL AUTO: NORMAL /HPF
BASOPHILS # BLD AUTO: 0.11 10*3/MM3 (ref 0–0.2)
BASOPHILS NFR BLD AUTO: 1 % (ref 0–1.5)
BILIRUB SERPL-MCNC: 0.5 MG/DL (ref 0–1.2)
BILIRUB UR QL STRIP: NEGATIVE
BUN SERPL-MCNC: 19 MG/DL (ref 8–23)
BUN/CREAT SERPL: 15.8 (ref 7–25)
CALCIUM SPEC-SCNC: 9.4 MG/DL (ref 8.6–10.5)
CHLORIDE SERPL-SCNC: 103 MMOL/L (ref 98–107)
CLARITY UR: CLEAR
CO2 SERPL-SCNC: 26.6 MMOL/L (ref 22–29)
COLOR UR: ABNORMAL
CREAT SERPL-MCNC: 1.2 MG/DL (ref 0.76–1.27)
CRP SERPL-MCNC: 0.64 MG/DL (ref 0–0.5)
DEPRECATED RDW RBC AUTO: 47.5 FL (ref 37–54)
EGFRCR SERPLBLD CKD-EPI 2021: 65.9 ML/MIN/1.73
EOSINOPHIL # BLD AUTO: 0.21 10*3/MM3 (ref 0–0.4)
EOSINOPHIL NFR BLD AUTO: 1.9 % (ref 0.3–6.2)
ERYTHROCYTE [DISTWIDTH] IN BLOOD BY AUTOMATED COUNT: 13.8 % (ref 12.3–15.4)
FLUAV RNA RESP QL NAA+PROBE: NOT DETECTED
FLUBV RNA RESP QL NAA+PROBE: NOT DETECTED
GLOBULIN UR ELPH-MCNC: 3.4 GM/DL
GLUCOSE BLDC GLUCOMTR-MCNC: 106 MG/DL (ref 70–130)
GLUCOSE SERPL-MCNC: 99 MG/DL (ref 65–99)
GLUCOSE UR STRIP-MCNC: NEGATIVE MG/DL
HCT VFR BLD AUTO: 51.3 % (ref 37.5–51)
HGB BLD-MCNC: 17.4 G/DL (ref 13–17.7)
HGB UR QL STRIP.AUTO: NEGATIVE
HOLD SPECIMEN: NORMAL
HOLD SPECIMEN: NORMAL
HYALINE CASTS UR QL AUTO: NORMAL /LPF
IMM GRANULOCYTES # BLD AUTO: 0.02 10*3/MM3 (ref 0–0.05)
IMM GRANULOCYTES NFR BLD AUTO: 0.2 % (ref 0–0.5)
KETONES UR QL STRIP: ABNORMAL
LEUKOCYTE ESTERASE UR QL STRIP.AUTO: NEGATIVE
LIPASE SERPL-CCNC: 45 U/L (ref 13–60)
LYMPHOCYTES # BLD AUTO: 3.23 10*3/MM3 (ref 0.7–3.1)
LYMPHOCYTES NFR BLD AUTO: 29.1 % (ref 19.6–45.3)
MCH RBC QN AUTO: 31.9 PG (ref 26.6–33)
MCHC RBC AUTO-ENTMCNC: 33.9 G/DL (ref 31.5–35.7)
MCV RBC AUTO: 94 FL (ref 79–97)
MONOCYTES # BLD AUTO: 0.93 10*3/MM3 (ref 0.1–0.9)
MONOCYTES NFR BLD AUTO: 8.4 % (ref 5–12)
NEUTROPHILS NFR BLD AUTO: 59.4 % (ref 42.7–76)
NEUTROPHILS NFR BLD AUTO: 6.59 10*3/MM3 (ref 1.7–7)
NITRITE UR QL STRIP: NEGATIVE
NRBC BLD AUTO-RTO: 0 /100 WBC (ref 0–0.2)
PH UR STRIP.AUTO: 5.5 [PH] (ref 5–8)
PLATELET # BLD AUTO: 311 10*3/MM3 (ref 140–450)
PMV BLD AUTO: 9 FL (ref 6–12)
POTASSIUM SERPL-SCNC: 4 MMOL/L (ref 3.5–5.2)
PROCALCITONIN SERPL-MCNC: 0.05 NG/ML (ref 0–0.25)
PROT SERPL-MCNC: 7.5 G/DL (ref 6–8.5)
PROT UR QL STRIP: ABNORMAL
RBC # BLD AUTO: 5.46 10*6/MM3 (ref 4.14–5.8)
RBC # UR STRIP: NORMAL /HPF
REF LAB TEST METHOD: NORMAL
SARS-COV-2 RNA RESP QL NAA+PROBE: NOT DETECTED
SODIUM SERPL-SCNC: 141 MMOL/L (ref 136–145)
SP GR UR STRIP: >1.03 (ref 1–1.03)
SQUAMOUS #/AREA URNS HPF: NORMAL /HPF
TROPONIN T SERPL HS-MCNC: 8 NG/L
UROBILINOGEN UR QL STRIP: ABNORMAL
WBC # UR STRIP: NORMAL /HPF
WBC NRBC COR # BLD AUTO: 11.09 10*3/MM3 (ref 3.4–10.8)
WHOLE BLOOD HOLD COAG: NORMAL
WHOLE BLOOD HOLD SPECIMEN: NORMAL

## 2024-02-28 PROCEDURE — 81001 URINALYSIS AUTO W/SCOPE: CPT | Performed by: PHYSICIAN ASSISTANT

## 2024-02-28 PROCEDURE — 80053 COMPREHEN METABOLIC PANEL: CPT | Performed by: PHYSICIAN ASSISTANT

## 2024-02-28 PROCEDURE — 71045 X-RAY EXAM CHEST 1 VIEW: CPT

## 2024-02-28 PROCEDURE — 99284 EMERGENCY DEPT VISIT MOD MDM: CPT

## 2024-02-28 PROCEDURE — 84484 ASSAY OF TROPONIN QUANT: CPT | Performed by: PHYSICIAN ASSISTANT

## 2024-02-28 PROCEDURE — 25810000003 SODIUM CHLORIDE 0.9 % SOLUTION: Performed by: PHYSICIAN ASSISTANT

## 2024-02-28 PROCEDURE — 84145 PROCALCITONIN (PCT): CPT | Performed by: PHYSICIAN ASSISTANT

## 2024-02-28 PROCEDURE — 82948 REAGENT STRIP/BLOOD GLUCOSE: CPT

## 2024-02-28 PROCEDURE — 87636 SARSCOV2 & INF A&B AMP PRB: CPT | Performed by: PHYSICIAN ASSISTANT

## 2024-02-28 PROCEDURE — 85025 COMPLETE CBC W/AUTO DIFF WBC: CPT | Performed by: PHYSICIAN ASSISTANT

## 2024-02-28 PROCEDURE — 83690 ASSAY OF LIPASE: CPT | Performed by: PHYSICIAN ASSISTANT

## 2024-02-28 PROCEDURE — 71045 X-RAY EXAM CHEST 1 VIEW: CPT | Performed by: RADIOLOGY

## 2024-02-28 PROCEDURE — 93010 ELECTROCARDIOGRAM REPORT: CPT | Performed by: SPECIALIST

## 2024-02-28 PROCEDURE — 93005 ELECTROCARDIOGRAM TRACING: CPT | Performed by: PHYSICIAN ASSISTANT

## 2024-02-28 PROCEDURE — 36415 COLL VENOUS BLD VENIPUNCTURE: CPT

## 2024-02-28 PROCEDURE — 86140 C-REACTIVE PROTEIN: CPT | Performed by: PHYSICIAN ASSISTANT

## 2024-02-28 RX ORDER — SODIUM CHLORIDE 0.9 % (FLUSH) 0.9 %
10 SYRINGE (ML) INJECTION AS NEEDED
Status: DISCONTINUED | OUTPATIENT
Start: 2024-02-28 | End: 2024-02-28 | Stop reason: HOSPADM

## 2024-02-28 RX ADMIN — SODIUM CHLORIDE 1000 ML: 9 INJECTION, SOLUTION INTRAVENOUS at 18:39

## 2024-02-28 NOTE — Clinical Note
[facility] EMERGENCY DEPARTMENT  [address] [address]  Phone: [phone]    Phill Nichols was seen and treated in our emergency department on 2/28/2024.  He may return to work on 02/29/2024.         Thank you for choosing [facility].    Jairo Matias PA-C

## 2024-02-28 NOTE — ED PROVIDER NOTES
Subjective   History of Present Illness  68-year-old male with past medical history of depression and hypertension presents to the emergency room with generalized fatigue.  Patient states he was at work for approximately 1 hour prior to arrival when he felt like his blood sugar was getting.  He states he is not a diabetic but has began feeling really weak for some reason.  He denies headache, visual changes, chest pain, back pain, abdominal pain, cough, dysuria, body aches, fever, or chills.  He denies any specific aggravating or alleviating factors.  Denies being on any sick contacts or travel.  Denies any other complaints or concerns at this time.    History provided by:  Patient   used: No        Review of Systems   Constitutional:  Positive for fatigue. Negative for fever.   HENT: Negative.     Respiratory: Negative.     Cardiovascular: Negative.  Negative for chest pain.   Gastrointestinal: Negative.  Negative for abdominal pain.   Endocrine: Negative.    Genitourinary: Negative.  Negative for dysuria.   Skin: Negative.    Neurological: Negative.    Psychiatric/Behavioral: Negative.     All other systems reviewed and are negative.      Past Medical History:   Diagnosis Date    Depression     Hypertension        Allergies   Allergen Reactions    Lisinopril        Past Surgical History:   Procedure Laterality Date    SHOULDER SURGERY         Family History   Problem Relation Age of Onset    No Known Problems Mother     No Known Problems Father     No Known Problems Sister     No Known Problems Brother     No Known Problems Maternal Aunt     No Known Problems Paternal Aunt     No Known Problems Maternal Uncle     No Known Problems Paternal Uncle     No Known Problems Maternal Grandfather     No Known Problems Maternal Grandmother     No Known Problems Paternal Grandfather     No Known Problems Paternal Grandmother     No Known Problems Cousin        Social History     Socioeconomic History     Marital status:    Tobacco Use    Smoking status: Never   Substance and Sexual Activity    Alcohol use: No    Drug use: No           Objective   Physical Exam  Vitals and nursing note reviewed.   Constitutional:       General: He is not in acute distress.     Appearance: He is well-developed. He is not diaphoretic.   HENT:      Head: Normocephalic and atraumatic.      Right Ear: External ear normal.      Left Ear: External ear normal.      Nose: Nose normal.   Eyes:      Conjunctiva/sclera: Conjunctivae normal.      Pupils: Pupils are equal, round, and reactive to light.   Neck:      Vascular: No JVD.      Trachea: No tracheal deviation.   Cardiovascular:      Rate and Rhythm: Normal rate and regular rhythm.      Heart sounds: Normal heart sounds. No murmur heard.  Pulmonary:      Effort: Pulmonary effort is normal. No respiratory distress.      Breath sounds: Normal breath sounds. No wheezing.   Abdominal:      General: Bowel sounds are normal.      Palpations: Abdomen is soft.      Tenderness: There is no abdominal tenderness.   Musculoskeletal:         General: No deformity. Normal range of motion.      Cervical back: Normal range of motion and neck supple.   Skin:     General: Skin is warm and dry.      Coloration: Skin is not pale.      Findings: No erythema or rash.   Neurological:      Mental Status: He is alert and oriented to person, place, and time.      Cranial Nerves: No cranial nerve deficit.   Psychiatric:         Behavior: Behavior normal.         Thought Content: Thought content normal.         Procedures           ED Course  ED Course as of 02/28/24 2025 Wed Feb 28, 2024 1834 EKG notes sinus bradycardia.  56 bpm.  I directly evaluated the EKG of this patient and noted no acute abnormalities.  Electronically signed by Jose Alejandro Quick DO, 02/28/24, 6:34 PM EST.   [SF]   1902 XR Chest 1 View [TK]   1940 Procalcitonin: 0.05 [TK]      ED Course User Index  [SF] Jose Alejandro Quick DO  [TK]  Jairo Matias PA-C                                   Results for orders placed or performed during the hospital encounter of 02/28/24   COVID-19 and FLU A/B PCR, 1 HR TAT - Swab, Nasopharynx    Specimen: Nasopharynx; Swab   Result Value Ref Range    COVID19 Not Detected Not Detected - Ref. Range    Influenza A PCR Not Detected Not Detected    Influenza B PCR Not Detected Not Detected   Comprehensive Metabolic Panel    Specimen: Blood   Result Value Ref Range    Glucose 99 65 - 99 mg/dL    BUN 19 8 - 23 mg/dL    Creatinine 1.20 0.76 - 1.27 mg/dL    Sodium 141 136 - 145 mmol/L    Potassium 4.0 3.5 - 5.2 mmol/L    Chloride 103 98 - 107 mmol/L    CO2 26.6 22.0 - 29.0 mmol/L    Calcium 9.4 8.6 - 10.5 mg/dL    Total Protein 7.5 6.0 - 8.5 g/dL    Albumin 4.1 3.5 - 5.2 g/dL    ALT (SGPT) 15 1 - 41 U/L    AST (SGOT) 20 1 - 40 U/L    Alkaline Phosphatase 117 39 - 117 U/L    Total Bilirubin 0.5 0.0 - 1.2 mg/dL    Globulin 3.4 gm/dL    A/G Ratio 1.2 g/dL    BUN/Creatinine Ratio 15.8 7.0 - 25.0    Anion Gap 11.4 5.0 - 15.0 mmol/L    eGFR 65.9 >60.0 mL/min/1.73   C-reactive Protein    Specimen: Blood   Result Value Ref Range    C-Reactive Protein 0.64 (H) 0.00 - 0.50 mg/dL   Lipase    Specimen: Blood   Result Value Ref Range    Lipase 45 13 - 60 U/L   Urinalysis With Microscopic If Indicated (No Culture) - Urine, Clean Catch    Specimen: Urine, Clean Catch   Result Value Ref Range    Color, UA Dark Yellow (A) Yellow, Straw    Appearance, UA Clear Clear    pH, UA 5.5 5.0 - 8.0    Specific Gravity, UA >1.030 (H) 1.005 - 1.030    Glucose, UA Negative Negative    Ketones, UA Trace (A) Negative    Bilirubin, UA Negative Negative    Blood, UA Negative Negative    Protein,  mg/dL (2+) (A) Negative    Leuk Esterase, UA Negative Negative    Nitrite, UA Negative Negative    Urobilinogen, UA 0.2 E.U./dL 0.2 - 1.0 E.U./dL   CBC Auto Differential    Specimen: Blood   Result Value Ref Range    WBC 11.09 (H) 3.40 - 10.80 10*3/mm3     RBC 5.46 4.14 - 5.80 10*6/mm3    Hemoglobin 17.4 13.0 - 17.7 g/dL    Hematocrit 51.3 (H) 37.5 - 51.0 %    MCV 94.0 79.0 - 97.0 fL    MCH 31.9 26.6 - 33.0 pg    MCHC 33.9 31.5 - 35.7 g/dL    RDW 13.8 12.3 - 15.4 %    RDW-SD 47.5 37.0 - 54.0 fl    MPV 9.0 6.0 - 12.0 fL    Platelets 311 140 - 450 10*3/mm3    Neutrophil % 59.4 42.7 - 76.0 %    Lymphocyte % 29.1 19.6 - 45.3 %    Monocyte % 8.4 5.0 - 12.0 %    Eosinophil % 1.9 0.3 - 6.2 %    Basophil % 1.0 0.0 - 1.5 %    Immature Grans % 0.2 0.0 - 0.5 %    Neutrophils, Absolute 6.59 1.70 - 7.00 10*3/mm3    Lymphocytes, Absolute 3.23 (H) 0.70 - 3.10 10*3/mm3    Monocytes, Absolute 0.93 (H) 0.10 - 0.90 10*3/mm3    Eosinophils, Absolute 0.21 0.00 - 0.40 10*3/mm3    Basophils, Absolute 0.11 0.00 - 0.20 10*3/mm3    Immature Grans, Absolute 0.02 0.00 - 0.05 10*3/mm3    nRBC 0.0 0.0 - 0.2 /100 WBC   High Sensitivity Troponin T    Specimen: Blood   Result Value Ref Range    HS Troponin T 8 <22 ng/L   Urinalysis, Microscopic Only - Urine, Clean Catch    Specimen: Urine, Clean Catch   Result Value Ref Range    RBC, UA 0-2 None Seen, 0-2 /HPF    WBC, UA 0-2 None Seen, 0-2 /HPF    Bacteria, UA None Seen None Seen /HPF    Squamous Epithelial Cells, UA None Seen None Seen, 0-2 /HPF    Hyaline Casts, UA 3-6 None Seen /LPF    Methodology Automated Microscopy    Procalcitonin    Specimen: Blood   Result Value Ref Range    Procalcitonin 0.05 0.00 - 0.25 ng/mL   POC Glucose Once    Specimen: Blood   Result Value Ref Range    Glucose 106 70 - 130 mg/dL   ECG 12 Lead Other; weakness   Result Value Ref Range    QT Interval 428 ms    QTC Interval 413 ms   Green Top (Gel)   Result Value Ref Range    Extra Tube Hold for add-ons.    Lavender Top   Result Value Ref Range    Extra Tube hold for add-on    Gold Top - SST   Result Value Ref Range    Extra Tube Hold for add-ons.    Light Blue Top   Result Value Ref Range    Extra Tube Hold for add-ons.        XR Chest 1 View   Final Result   No acute  cardiopulmonary process.           This report was finalized on 2/28/2024 6:43 PM by Alex Pallas, DO.                        Medical Decision Making  68-year-old male with past medical history of depression and hypertension presents to the emergency room with generalized fatigue.  Patient states he was at work for approximately 1 hour prior to arrival when he felt like his blood sugar was getting.  He states he is not a diabetic but has began feeling really weak for some reason.  He denies headache, visual changes, chest pain, back pain, abdominal pain, cough, dysuria, body aches, fever, or chills.  He denies any specific aggravating or alleviating factors.  Denies being on any sick contacts or travel.  Denies any other complaints or concerns at this time.    Uncomplicated ED course.  Patient with a benign workup in the emergency room.  Patient states he is ready to go home and would like to go back to work.  He has been encouraged should he develop new symptoms or not get any better to return to the emergency room for further evaluation and treatment.    Problems Addressed:  Fatigue, unspecified type: complicated acute illness or injury    Amount and/or Complexity of Data Reviewed  Labs: ordered. Decision-making details documented in ED Course.  Radiology: ordered. Decision-making details documented in ED Course.  ECG/medicine tests: ordered.    Risk  Prescription drug management.        Final diagnoses:   Fatigue, unspecified type       ED Disposition  ED Disposition       ED Disposition   Discharge    Condition   Stable    Comment   --               Julia Becerra, APRN  [address] [facility] [address]  [phone]    In 2 days           Medication List      No changes were made to your prescriptions during this visit.            Jairo Matias PA-C  02/28/24 2025

## 2024-02-28 NOTE — Clinical Note
[facility] EMERGENCY DEPARTMENT  [address] [address]  Phone: [phone]    Phill Nichols was seen and treated in our emergency department on 2/28/2024.  He may return to work on 02/29/2024.         Thank you for choosing [facility].    Jairo Matias PA-C

## 2024-02-29 LAB
QT INTERVAL: 428 MS
QTC INTERVAL: 413 MS